# Patient Record
Sex: FEMALE | Race: WHITE | URBAN - METROPOLITAN AREA
[De-identification: names, ages, dates, MRNs, and addresses within clinical notes are randomized per-mention and may not be internally consistent; named-entity substitution may affect disease eponyms.]

---

## 2019-09-10 ENCOUNTER — INPATIENT HOSPITAL (OUTPATIENT)
Dept: URBAN - METROPOLITAN AREA HOSPITAL 76 | Facility: HOSPITAL | Age: 84
End: 2019-09-10
Payer: COMMERCIAL

## 2019-09-10 DIAGNOSIS — R10.30 LOWER ABDOMINAL PAIN, UNSPECIFIED: ICD-10-CM

## 2019-09-10 DIAGNOSIS — R11.0 NAUSEA: ICD-10-CM

## 2019-09-10 DIAGNOSIS — R19.7 DIARRHEA, UNSPECIFIED: ICD-10-CM

## 2019-09-10 PROCEDURE — 99222 1ST HOSP IP/OBS MODERATE 55: CPT | Performed by: NURSE PRACTITIONER

## 2019-09-11 ENCOUNTER — INPATIENT HOSPITAL (OUTPATIENT)
Dept: URBAN - METROPOLITAN AREA HOSPITAL 76 | Facility: HOSPITAL | Age: 84
End: 2019-09-11
Payer: COMMERCIAL

## 2019-09-11 DIAGNOSIS — R19.7 DIARRHEA, UNSPECIFIED: ICD-10-CM

## 2019-09-11 PROCEDURE — 99231 SBSQ HOSP IP/OBS SF/LOW 25: CPT | Performed by: NURSE PRACTITIONER

## 2021-02-17 DIAGNOSIS — Z23 IMMUNIZATION DUE: ICD-10-CM

## 2022-12-12 ENCOUNTER — HOSPITAL ENCOUNTER (OUTPATIENT)
Facility: HOSPITAL | Age: 87
Discharge: HOME | End: 2022-12-12
Attending: EMERGENCY MEDICINE | Admitting: EMERGENCY MEDICINE

## 2022-12-12 ENCOUNTER — APPOINTMENT (OUTPATIENT)
Dept: GENERAL RADIOLOGY | Facility: HOSPITAL | Age: 87
End: 2022-12-12

## 2022-12-12 VITALS
TEMPERATURE: 97.8 F | HEIGHT: 64 IN | SYSTOLIC BLOOD PRESSURE: 165 MMHG | DIASTOLIC BLOOD PRESSURE: 78 MMHG | WEIGHT: 135 LBS | BODY MASS INDEX: 23.05 KG/M2 | RESPIRATION RATE: 16 BRPM | HEART RATE: 72 BPM | OXYGEN SATURATION: 93 %

## 2022-12-12 DIAGNOSIS — M79.672 LEFT FOOT PAIN: Primary | ICD-10-CM

## 2022-12-12 PROCEDURE — 73630 X-RAY EXAM OF FOOT: CPT

## 2022-12-12 PROCEDURE — 99203 OFFICE O/P NEW LOW 30 MIN: CPT | Performed by: EMERGENCY MEDICINE

## 2022-12-12 PROCEDURE — G0463 HOSPITAL OUTPT CLINIC VISIT: HCPCS | Performed by: EMERGENCY MEDICINE

## 2022-12-12 NOTE — DISCHARGE INSTRUCTIONS
Use walking boot when ambulating around.  Continue with Tylenol and tramadol for pain.  Rest, ice and elevate foot whenever possible.  Contact podiatry and arrange for follow-up in the next 48 hours.  Return to ER for any concerns.

## 2022-12-12 NOTE — FS[facility] PROVIDER NOTE
Subjective   History of Present Illness  The patient is a 89-year-old  female who is relatively healthy, who presents emergency room with complaints of left foot pain.  Patient reports increasing left foot pain and swelling over the past 3 days.  Patient denies any known injury or trauma.  Family member state that she is quite active and does walk quite a bit.  She complains of pain to the lateral aspect of her left foot.  There is swelling there.  The area is tender to touch.  No ankle pain.  No other injury or trauma.  She is here for an evaluation.        Review of Systems   Constitutional: Negative.  Negative for chills, fatigue and fever.   Eyes: Negative.    Respiratory: Negative for cough, chest tightness and shortness of breath.    Cardiovascular: Positive for leg swelling. Negative for chest pain and palpitations.   Gastrointestinal: Negative for abdominal pain, diarrhea, nausea and vomiting.   Genitourinary: Negative.    Musculoskeletal: Positive for arthralgias and joint swelling.   Skin: Negative.  Negative for rash.   Neurological: Negative.  Negative for syncope, weakness, numbness and headaches.   Psychiatric/Behavioral: Negative.    All other systems reviewed and are negative.      Past Medical History:   Diagnosis Date   • Carpal tunnel syndrome    • Ear, nose and throat disorder    • Ganglion cyst    • Hypertension    • Injury of flexor tendon of hand    • Osteoarthritis    • Pain, hand        Allergies   Allergen Reactions   • Codeine GI Intolerance   • Penicillins Rash       Past Surgical History:   Procedure Laterality Date   • CATARACT EXTRACTION     • CHOLECYSTECTOMY     • COLON SURGERY  1998    Tumor removal   • TOTAL HIP ARTHROPLASTY Right        Family History   Problem Relation Age of Onset   • Osteoarthritis Mother    • Heart attack Mother    • Stroke Father    • Cancer Other    • Heart attack Other    • Osteoarthritis Child        Social History     Socioeconomic History   •  Marital status:    Tobacco Use   • Smoking status: Former   Substance and Sexual Activity   • Alcohol use: No           Objective   Physical Exam  Vitals and nursing note reviewed.   Constitutional:       General: She is not in acute distress.     Appearance: Normal appearance. She is normal weight.   HENT:      Right Ear: External ear normal.      Left Ear: External ear normal.      Nose: Nose normal.   Cardiovascular:      Rate and Rhythm: Normal rate.   Pulmonary:      Effort: Pulmonary effort is normal.   Musculoskeletal:         General: Normal range of motion.      Cervical back: Normal range of motion.      Right foot: Normal.      Left foot: Swelling, tenderness and bony tenderness present.   Skin:     Capillary Refill: Capillary refill takes less than 2 seconds.   Neurological:      General: No focal deficit present.      Mental Status: She is alert and oriented to person, place, and time.   Psychiatric:         Mood and Affect: Mood normal.         Procedures           ED Course  ED Course as of 12/12/22 1751   Mon Dec 12, 2022   1730 Negative for fracture.  Patient placed in walking boot.  Advised PCP/podiatry follow-up. [KZ]      ED Course User Index  [KZ] Oswald Toscano MD                                           [facility]    Final diagnoses:   Left foot pain       ED Disposition  ED Disposition     ED Disposition   Discharge    Condition   Stable    Comment   --             ZIA Cooley DPM  [address] [place] [facility]  [phone]    Call in 1 day  For repeat evaluation         Medication List      No changes were made to your prescriptions during this visit.